# Patient Record
Sex: MALE | Race: WHITE | URBAN - METROPOLITAN AREA
[De-identification: names, ages, dates, MRNs, and addresses within clinical notes are randomized per-mention and may not be internally consistent; named-entity substitution may affect disease eponyms.]

---

## 2021-08-05 ENCOUNTER — EMERGENCY (EMERGENCY)
Facility: HOSPITAL | Age: 41
LOS: 1 days | End: 2021-08-05
Admitting: EMERGENCY MEDICINE
Payer: MEDICAID

## 2021-08-05 PROCEDURE — 99285 EMERGENCY DEPT VISIT HI MDM: CPT

## 2021-08-05 PROCEDURE — 99053 MED SERV 10PM-8AM 24 HR FAC: CPT

## 2021-08-06 PROCEDURE — 70450 CT HEAD/BRAIN W/O DYE: CPT | Mod: 26

## 2021-08-06 PROCEDURE — 71045 X-RAY EXAM CHEST 1 VIEW: CPT | Mod: 26

## 2021-08-06 PROCEDURE — 93010 ELECTROCARDIOGRAM REPORT: CPT

## 2021-08-07 PROCEDURE — 99213 OFFICE O/P EST LOW 20 MIN: CPT | Mod: 95

## 2021-08-07 RX ORDER — RISPERIDONE 4 MG/1
1 TABLET ORAL
Refills: 0 | Status: DISCONTINUED | OUTPATIENT
Start: 2021-08-08 | End: 2021-08-12

## 2021-08-07 RX ORDER — ACETAMINOPHEN 500 MG
650 TABLET ORAL EVERY 6 HOURS
Refills: 0 | Status: DISCONTINUED | OUTPATIENT
Start: 2021-08-08 | End: 2021-08-12

## 2021-08-07 RX ORDER — HALOPERIDOL DECANOATE 100 MG/ML
5 INJECTION INTRAMUSCULAR EVERY 4 HOURS
Refills: 0 | Status: DISCONTINUED | OUTPATIENT
Start: 2021-08-08 | End: 2021-08-12

## 2021-08-07 RX ORDER — TRAZODONE HCL 50 MG
50 TABLET ORAL AT BEDTIME
Refills: 0 | Status: DISCONTINUED | OUTPATIENT
Start: 2021-08-08 | End: 2021-08-12

## 2021-08-08 ENCOUNTER — INPATIENT (INPATIENT)
Facility: HOSPITAL | Age: 41
LOS: 3 days | Discharge: HOME | DRG: 897 | End: 2021-08-12
Attending: PSYCHIATRY & NEUROLOGY | Admitting: PSYCHIATRY & NEUROLOGY
Payer: MEDICAID

## 2021-08-08 VITALS
OXYGEN SATURATION: 99 % | SYSTOLIC BLOOD PRESSURE: 118 MMHG | HEART RATE: 66 BPM | RESPIRATION RATE: 17 BRPM | TEMPERATURE: 98 F | DIASTOLIC BLOOD PRESSURE: 76 MMHG

## 2021-08-08 PROCEDURE — 90792 PSYCH DIAG EVAL W/MED SRVCS: CPT | Mod: 95

## 2021-08-08 NOTE — CHART NOTE - NSCHARTNOTEFT_GEN_A_CORE
Reviewed [facility] records. Pt has been using crystal meth and was utterly disorganized on presentation there with thought blocking and inability to provide a narrative. Today pt is able to speak at length about his experiences but is very paranoid. He believes all of [employer] [place] is in cohoots against him spencer. his friend and landlord who is actually running a brothel out of the ground floor of the building. Pt believes "THEY" are poisoning him by putting some chemical in the fan of his car. Pt also put in a 72 hour letter. He has not been eating on the unit. -SI/HI/AH/VH. +PI.

## 2021-08-08 NOTE — ED BEHAVIORAL HEALTH NOTE - BEHAVIORAL HEALTH NOTE
“Patient interviewed and part B completed at [facility]. Orders have been entered and handoff provided to    by previous psychiatrist on day shift.  Full ED BH Assessment is located in MRN [id number] from [facility] ([facility]).

## 2021-08-09 DIAGNOSIS — F29 UNSPECIFIED PSYCHOSIS NOT DUE TO A SUBSTANCE OR KNOWN PHYSIOLOGICAL CONDITION: ICD-10-CM

## 2021-08-09 DIAGNOSIS — F19.922 OTHER PSYCHOACTIVE SUBSTANCE USE, UNSPECIFIED WITH INTOXICATION WITH PERCEPTUAL DISTURBANCE: ICD-10-CM

## 2021-08-09 DIAGNOSIS — F22 DELUSIONAL DISORDERS: ICD-10-CM

## 2021-08-09 PROCEDURE — 99232 SBSQ HOSP IP/OBS MODERATE 35: CPT | Mod: GC

## 2021-08-09 NOTE — BEHAVIORAL HEALTH ASSESSMENT NOTE - NSBHADMITIPSTRENGTH_PSY_A_CORE
In good physical health/Attempting to realize his/her potential/Has supportive interpersonal relationships with family, friends or peers/Has vocational interests or hobbies/Able to exercise self-direction/Involved in cultural/spiritual/Tenriism/community activities/Steady employment/Financial stability/Good impulse control/Intelligent/Creative

## 2021-08-09 NOTE — BEHAVIORAL HEALTH ASSESSMENT NOTE - NSBHCHARTREVIEWVS_PSY_A_CORE FT
Vital Signs Last 24 Hrs  T(C): 36.8 (09 Aug 2021 09:36), Max: 36.9 (08 Aug 2021 17:00)  T(F): 98.3 (09 Aug 2021 09:36), Max: 98.4 (08 Aug 2021 17:00)  HR: 92 (09 Aug 2021 09:36) (60 - 92)  BP: 108/68 (09 Aug 2021 09:36) (95/61 - 128/71)  BP(mean): --  RR: 20 (09 Aug 2021 09:36) (16 - 20)  SpO2: 98% (09 Aug 2021 09:36) (98% - 99%)

## 2021-08-09 NOTE — BEHAVIORAL HEALTH ASSESSMENT NOTE - SUMMARY
Patient is a 40 yo M with no past medical or psychiatric history, transferred to  from [facility], [place], where he was brought in for paranoid delusions involving people in his apartment building poisoning him and hacking his information. This is in the setting of recent crystal meth use. Patient demonstrates poor insight, unable to say why he is in the hospital since he feels these delusions are based in truth. We will start him on Risperidone 1 mg BID to treat acute psychosis.     Patient is vulnerable due to his lack of connection with outpatient treatment as this is his first episode, crystal meth use that may have precipitated his current psychosis, and currently unstable housing situation as his apartment is the setting of his delusions. He is protected by social support from his parents, employment, reported financial stability, and intelligence. Patient is a 42 yo M with history of bipolar disorder, polysubstance use disorder, no known past hospitalizations, transferred to  from [facility], where he was brought in confused, thought blocked, intoxicated, attempting to break into someone's house in [place]. This is in the setting of recent crystal meth use, reported life stressor, and untreated bipolar disorder. Patient demonstrates poor insight, unable to say why he is in the hospital since he feels these delusions are based in truth. We will start him on Risperidone 1 mg BID to treat acute psychosis.     Patient is vulnerable due to his lack of connection with outpatient treatment for bipolar disorder, amphetamine use (crystal meth?) that may have precipitated his current psychosis, and currently unstable housing situation as his apartment is the setting of his delusions. He is protected by social support from his parents, employment, reported financial stability, and intelligence.

## 2021-08-09 NOTE — BEHAVIORAL HEALTH ASSESSMENT NOTE - CASE SUMMARY
--interval cc and focus of care: paranoid thinking and anxiety ; outcome to date: patient refusing Risperdal for paranoia ; submitted 3DL (patient is 2PC);    --background: 40 yo M with history of bipolar disorder, amphetamine use, marijuana use, no other known psychiatric history, no known medical issues, employed, domiciled in private apartment in [place], transferred from [facility]. At [place], he was BIBEMS after "being found wandering barefoot in front of other people's houses and attempting to get in" while in [place]. Patient was acutely confused and thought blocked, unable to provide immediate answers about where he lives, what he does for work, or even . He admitted to use of alcohol, adderall, and cocaine earlier in the night. UTox at [facility] was positive for amphetamines and cannabis. Collateral obtained from mother, glen said he had untreated bipolar disorder exacerbated by recent stressors and advocated for his admission.   --course:  ;;: anxious ; refusing medications; paranoid thinking; not aggressive;  seeking release.  good adls; monitor behavior and judgment.   monitor for spontaneous improvement given amphetamine use and paranoid thinking  Alert; oriented x3; fund of knowledge intact; reg/recalls 3/3;  speech clear; good sometimes intense eye contact; talks about disatisfaction with his place of residence but makes comments about gases ; problems with credit cards ; not clear why sought psychisatric care "Feels safe" but characterizing his admission as voluntary (actually 2PC) and wishing to leave and disagreeing with use of Risperdal.  States he uses coca tea (which can give  positives for cocaine) and CBD guarded; anxious; slightly irritable; denies s/h i/i/p or avh;   paranoid thinking .  ij:  poor.    No acute medical issues;

## 2021-08-09 NOTE — BEHAVIORAL HEALTH ASSESSMENT NOTE - HPI (INCLUDE ILLNESS QUALITY, SEVERITY, DURATION, TIMING, CONTEXT, MODIFYING FACTORS, ASSOCIATED SIGNS AND SYMPTOMS)
Patient is a 42 yo M with history of crystal meth use, marijuana use, no other known psychiatric history, no known medical issues, employed, domiciled in private apartment in [place], transferred from [facility] where he was admitted for paranoid ideation/delusions about being poisoned at his apartment building.     On intake, patient shares that he is here because his friend tricked him into moving into an apartment he owns in [place], [place]. Where he now lives, there is a "brothel," on the first floor. Patient believes that people from the brothel are poisoning the vents in his apartment and his car with drugs that are causing him as well as his dogs to feel strange. He also says they have hacked into his phone, and that they are the reason that "all his credit cards" are no longer working. When asked why patient ended up at the hospital instead of going to a hotel or to stay with family, he is unable to offer a logical answer. He perseverates on these people and on the poison. He denies AVH/SI/HI. He denies sleep or appetite changes. He says he does not want to eat the food here because he prefers to only eat salad, but says he will if it means he will be discharged sooner.     Patient has no past psychiatric history, and does not report any medical issues. He takes no medications. He does not have a psychiatrist or therapist. Per chart review from [facility] patient uses crystal meth. UDS there was positive for cocaine. He says the reason for this is because he drinks "Coca" tea, which is made from the coca leaf, which is also used to make cocaine. Patient also reports marijuana/CBD use. He works as a  for [employer]. He completed high school and two years of college. He grew up in [place], where his parents still live. He has no siblings. He has no partner or children, has never been , and says he is not interested in dating. He says he enjoys yoga and reading about plants. Patient is a 40 yo M with history of bipolar disorder, amphetamine use, marijuana use, no other known psychiatric history, no known medical issues, employed, domiciled in private apartment in [place], transferred from [facility]. At [place], he was BIBEMS after "being found wandering barefoot in front of other people's houses and attempting to get in" while in [place]. Patient was acutely confused and thought blocked, unable to provide immediate answers about where he lives, what he does for work, or even . He admitted to use of alcohol, adderall, and cocaine earlier in the night. UTox at [facility] was positive for amphetamines and cannabis. Collateral obtained said he had untreated bipolar disorder exacerbated by recent stressors.     On intake, patient shares that he is here because his friend tricked him into moving into an apartment he owns in [place], [place]. Where he now lives, there is a "brothel," on the first floor. Patient believes that people from the brothel are poisoning the vents in his apartment and his car with drugs that are causing him as well as his dogs to feel strange. He also says they have hacked into his phone, and that they are the reason that "all his credit cards" are no longer working. When asked why patient ended up at the hospital instead of going to a hotel or to stay with family, he is unable to offer a logical answer. He perseverates on these people and on the poison. He denies AVH/SI/HI. He denies sleep or appetite changes. He says he does not want to eat the food here because he prefers to only eat salad, but says he will if it means he will be discharged sooner. He denies any history of manic or depressive episodes.     Patient has no past psychiatric history, and does not report any medical issues. He takes no medications. He does not have a psychiatrist or therapist. Per chart review from [facility] patient uses crystal meth. UDS there was positive for amphetamines and cannabis. He says the reason for this is because he drinks "Coca" tea, which is made from the coca leaf, which is also used to make cocaine (though UDS was negative for cocaine). Patient also reports marijuana/CBD use. He works as a  for [employer]. He completed high school and two years of college. He grew up in [place], where his parents still live. He has no siblings. He has no partner or children, has never been , and says he is not interested in dating. He says he enjoys yoga and reading about plants. Patient is a 40 yo M with history of bipolar disorder, amphetamine use, marijuana use, no other known psychiatric history, no known medical issues, employed, domiciled in private apartment in [place], transferred from [facility]. At [place], he was BIBEMS after "being found wandering barefoot in front of other people's houses and attempting to get in" while in [place]. Patient was acutely confused and thought blocked, unable to provide immediate answers about where he lives, what he does for work, or even . He admitted to use of alcohol, adderall, and cocaine earlier in the night. UTox at [facility] was positive for amphetamines and cannabis. Collateral obtained from mother, whop said he had untreated bipolar disorder exacerbated by recent stressors and advocated for his admission.     On intake, patient shares that he is here because his friend tricked him into moving into an apartment he owns in [place], [place]. Where he now lives, there is a "brothel," on the first floor. Patient believes that people from the brothel are poisoning the vents in his apartment and his car with drugs that are causing him as well as his dogs to feel strange. He also says they have hacked into his phone, and that they are the reason that "all his credit cards" are no longer working. When asked why patient ended up at the hospital instead of going to a hotel or to stay with family, he is unable to offer a logical answer. He perseverates on these people and on the poison. He denies AVH/SI/HI. He denies sleep or appetite changes. He says he does not want to eat the food here because he prefers to only eat salad, but says he will if it means he will be discharged sooner. He denies any history of manic or depressive episodes.     Patient has no past psychiatric history, and does not report any medical issues. He takes no medications. He does not have a psychiatrist or therapist. Per chart review from [facility] patient uses crystal meth. UDS there was positive for amphetamines and cannabis. He says the reason for this is because he drinks "Coca" tea, which is made from the coca leaf, which is also used to make cocaine (though UDS was negative for cocaine). Patient also reports marijuana/CBD use. He works as a  for [employer]. He completed high school and two years of college. He grew up in [place], where his parents still live. He has no siblings. He has no partner or children, has never been , and says he is not interested in dating. He says he enjoys yoga and reading about plants.

## 2021-08-09 NOTE — BEHAVIORAL HEALTH ASSESSMENT NOTE - NSBHREFERDETAILS_PSY_A_CORE_FT
Pt has been using crystal meth and was utterly disorganized on presentation there with thought blocking and inability to provide a narrative. Today pt is able to speak at length about his experiences but is very paranoid. He believes all of [employer] [place] is in cohoots against him spencer. his friend and landlord who is actually running a brothel out of the ground floor of the building. Pt believes "THEY" are poisoning him by putting some chemical in the fan of his car. Pt also put in a 72 hour letter. He has not been eating on the unit. -SI/HI/AH/VH. +PI.

## 2021-08-09 NOTE — BEHAVIORAL HEALTH ASSESSMENT NOTE - DIFFERENTIAL
personality disorder unspecified, psychosis unspecified, substance-induced psychotic disorder, crystal meth use disorder bipolar disorder, psychosis unspecified, substance-induced psychotic disorder, crystal meth use disorder

## 2021-08-10 LAB
A1C WITH ESTIMATED AVERAGE GLUCOSE RESULT: 5.1 % — SIGNIFICANT CHANGE UP (ref 4–5.6)
CHOLEST SERPL-MCNC: 137 MG/DL — SIGNIFICANT CHANGE UP
ESTIMATED AVERAGE GLUCOSE: 100 MG/DL — SIGNIFICANT CHANGE UP (ref 68–114)
HDLC SERPL-MCNC: 38 MG/DL — LOW
LIPID PNL WITH DIRECT LDL SERPL: 79 MG/DL — SIGNIFICANT CHANGE UP
NON HDL CHOLESTEROL: 99 MG/DL — SIGNIFICANT CHANGE UP
TRIGL SERPL-MCNC: 100 MG/DL — SIGNIFICANT CHANGE UP

## 2021-08-10 PROCEDURE — 99233 SBSQ HOSP IP/OBS HIGH 50: CPT

## 2021-08-10 NOTE — PROGRESS NOTE BEHAVIORAL HEALTH - NSBHADMITCOUNSELOTHER_PSY_A_CORE FT
Discussed the nefarious happenings in [place], [place], how he remembers how he got the scabs on his hands (before he had said they were part of the [place] plot but now he has a logical, reality-based explanation), and he reported how many healthy habits he has including yoga.

## 2021-08-11 PROCEDURE — 99232 SBSQ HOSP IP/OBS MODERATE 35: CPT | Mod: GC

## 2021-08-11 NOTE — PROGRESS NOTE BEHAVIORAL HEALTH - CASE SUMMARY
Substance induced-psychosis and pt has improved. He is less delusional. However he remains delusional enough that he might benefit from further treatment and it seems at this time tomld go to court for retention.
--interval cc and focus of care: paranoid thinking and anxiety ; outcome to date: patient refusing Risperdal for paranoia ; submitted 3DL (patient is 2PC);  improving paranoid thinking less prominent; does not meet criteria for involuntary treatment; dc in am ().  --background: 42 yo M with history of bipolar disorder, amphetamine use, marijuana use, no other known psychiatric history, no known medical issues, employed, domiciled in private apartment in [place], transferred from [facility]. At [place], he was BIBEMS after "being found wandering barefoot in front of other people's houses and attempting to get in" while in [place]. Patient was acutely confused and thought blocked, unable to provide immediate answers about where he lives, what he does for work, or even . He admitted to use of alcohol, adderall, and cocaine earlier in the night. UTox at [facility] was positive for amphetamines and cannabis. Collateral obtained from mother, whop said he had untreated bipolar disorder exacerbated by recent stressors and advocated for his admission.   --course:  ;;: anxious ; refusing medicaitons; parnaoid thinking; not aggressive;  seeking release.  good adls; monitor behavior and judgment.   monitor for spontaneous improvement given amphetamine use and paranoid thinking/    ;;: paranoid thinking less prominent and a little less anxious and a little less constricted; states he will be abstinent from drugs;  refusing Risperdal because "He doesn't need it";  dc at Court Order  "I am only going to eat salads";  patient requesting discharge; states he will abstain from all drugs; states sardines are a source of his protein.  Polite; anxious; denies avh or s/h i/i/p .  No reports of aggressive behavior or verbal threats;  dc in am as per order of the Court.

## 2021-08-11 NOTE — PROGRESS NOTE BEHAVIORAL HEALTH - MODIFICATIONS
Plan reviewed
improving with abstinence but refusing medication; d/c by Court Order; not meeting criteria for involuntary admiission

## 2021-08-12 VITALS
SYSTOLIC BLOOD PRESSURE: 110 MMHG | TEMPERATURE: 98 F | DIASTOLIC BLOOD PRESSURE: 64 MMHG | OXYGEN SATURATION: 99 % | RESPIRATION RATE: 20 BRPM | HEART RATE: 77 BPM

## 2021-08-12 PROCEDURE — 99232 SBSQ HOSP IP/OBS MODERATE 35: CPT

## 2021-08-12 NOTE — PROGRESS NOTE BEHAVIORAL HEALTH - NSBHCHARTREVIEWVS_PSY_A_CORE FT
Vital Signs Last 24 Hrs  T(C): 36.8 (10 Aug 2021 09:00), Max: 37.1 (09 Aug 2021 17:02)  T(F): 98.3 (10 Aug 2021 09:00), Max: 98.8 (09 Aug 2021 17:02)  HR: 99 (10 Aug 2021 09:00) (71 - 99)  BP: 118/73 (10 Aug 2021 09:00) (112/73 - 123/71)  BP(mean): --  RR: 18 (10 Aug 2021 09:00) (18 - 20)  SpO2: 99% (10 Aug 2021 09:00) (99% - 100%)
Vital Signs Last 24 Hrs  T(C): 36.9 (11 Aug 2021 16:48), Max: 36.9 (11 Aug 2021 16:48)  T(F): 98.4 (11 Aug 2021 16:48), Max: 98.4 (11 Aug 2021 16:48)  HR: 63 (11 Aug 2021 16:48) (63 - 71)  BP: 120/70 (11 Aug 2021 16:48) (113/76 - 120/70)  BP(mean): --  RR: 18 (11 Aug 2021 16:48) (18 - 18)  SpO2: 99% (11 Aug 2021 16:48) (99% - 100%)
Vital Signs Last 24 Hrs  T(C): 36.9 (10 Aug 2021 20:39), Max: 37.1 (10 Aug 2021 15:00)  T(F): 98.4 (10 Aug 2021 20:39), Max: 98.7 (10 Aug 2021 15:00)  HR: 79 (10 Aug 2021 20:39) (79 - 99)  BP: 119/78 (10 Aug 2021 20:39) (112/71 - 119/78)  BP(mean): --  RR: 18 (10 Aug 2021 20:39) (18 - 20)  SpO2: 99% (10 Aug 2021 20:39) (95% - 99%)

## 2021-08-12 NOTE — PROGRESS NOTE BEHAVIORAL HEALTH - PRIMARY DX
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
Psychosis, unspecified psychosis type
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

## 2021-08-12 NOTE — PROGRESS NOTE BEHAVIORAL HEALTH - PROBLEM SELECTOR PROBLEM 2
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

## 2021-08-12 NOTE — PROGRESS NOTE BEHAVIORAL HEALTH - NSBHFUPINTERVALHXFT_PSY_A_CORE
Patient seen today in [facility], where he had created a yoga mat out of towels and was kneeling. He refused his Risperidone yesterday. He says he is not sick and only puts natural things into his body. He says he did not take crystal meth (as was mentioned in the chart), but does use Adderall. He reiterates that it is true that the people in the brothel (disguised as a restaurant) in the first floor of the building were poisoning him. He continues to perseverate on this. When confronted about how he had broken into a house in [place], he says it was because he was poisoned. He denies AVH/SI/HI.
Patient seen today in [facility]. He was excited, as he was discharged by the court and is going home tomorrow. He appears more linear, less oddly related, and less delusional today. He is goal-oriented, saying that he plans tomorrow after discharge to get his tablet and go to the bank. He was in the process of making a list on his phone of all the things he needs to do on discharge, which seemed practical and reasonable. It seems more likely that patient's presentation of delusions may have been substance-induced (amphetamine) rather than from a primary psychotic disorder. He maintains that he believes the people from the brothel in [place] were poisoning him, but says "I'm outta there," indicating that he intends to move without delay.
Talks about future plans to move to [place]; how he has two dogs ; how he used to trade stocks using oversold strategy;  no s/h i/i/p or avh; anxious but not expressing spontaneously any paranoid thoughts about where he lived or people harming him; speech clear;  States that he used Coca Tea to help with knee pain; encouraged to seek medical evaluation of cause of pain.

## 2021-08-17 DIAGNOSIS — F19.922 OTHER PSYCHOACTIVE SUBSTANCE USE, UNSPECIFIED WITH INTOXICATION WITH PERCEPTUAL DISTURBANCE: ICD-10-CM

## 2021-08-17 DIAGNOSIS — F31.9 BIPOLAR DISORDER, UNSPECIFIED: ICD-10-CM

## 2021-08-17 DIAGNOSIS — F15.950 OTHER STIMULANT USE, UNSPECIFIED WITH STIMULANT-INDUCED PSYCHOTIC DISORDER WITH DELUSIONS: ICD-10-CM

## 2021-08-17 DIAGNOSIS — F22 DELUSIONAL DISORDERS: ICD-10-CM

## 2021-08-17 DIAGNOSIS — F12.950 CANNABIS USE, UNSPECIFIED WITH PSYCHOTIC DISORDER WITH DELUSIONS: ICD-10-CM

## 2021-08-17 DIAGNOSIS — F29 UNSPECIFIED PSYCHOSIS NOT DUE TO A SUBSTANCE OR KNOWN PHYSIOLOGICAL CONDITION: ICD-10-CM

## 2021-08-22 PROCEDURE — 80061 LIPID PANEL: CPT

## 2021-08-22 PROCEDURE — 83036 HEMOGLOBIN GLYCOSYLATED A1C: CPT

## 2021-08-22 PROCEDURE — 36415 COLL VENOUS BLD VENIPUNCTURE: CPT

## 2023-11-15 NOTE — PROGRESS NOTE BEHAVIORAL HEALTH - SUMMARY
Patient is a 40 yo M with history of bipolar disorder, polysubstance use disorder, no known past hospitalizations, transferred to  from [facility], where he was brought in confused, thought blocked, intoxicated, attempting to break into someone's house in [place]. This is in the setting of recent crystal meth use, reported life stressor, and untreated bipolar disorder. Patient demonstrates poor insight, unable to say why he is in the hospital since he feels these delusions are based in truth. We will start him on Risperidone 1 mg BID to treat acute psychosis. He is refusing medication at this time.     Patient is vulnerable due to his lack of connection with outpatient treatment for bipolar disorder, amphetamine use that may have precipitated his current psychosis, and currently unstable housing situation as his apartment is the setting of his delusions. He is also at risk because he is not complying with treatment with Risperidone at this time. He is protected by social support from his parents, employment, reported financial stability, and intelligence.    8/10: patient refusing Risperidone. saying he does not need it, continues to perseverate on delusion of being poisoned; doing yoga on towel in TV room
--interval cc and focus of care: paranoid thinking and anxiety ; outcome to date: patient refusing Risperdal for paranoia ; submitted 3DL (patient is 2PC);  improving paranoid thinking less prominent; does not meet criteria for involuntary treatment; dc in am ().  --background: 40 yo M with history of bipolar disorder, amphetamine use, marijuana use, no other known psychiatric history, no known medical issues, employed, domiciled in private apartment in [place], transferred from [facility]. At [place], he was BIBEMS after "being found wandering barefoot in front of other people's houses and attempting to get in" while in [place]. Patient was acutely confused and thought blocked, unable to provide immediate answers about where he lives, what he does for work, or even . He admitted to use of alcohol, adderall, and cocaine earlier in the night. UTox at [facility] was positive for amphetamines and cannabis. Collateral obtained from mother, whop said he had untreated bipolar disorder exacerbated by recent stressors and advocated for his admission.   --course:  ;;: anxious ; refusing medicaitons; parnaoid thinking; not aggressive;  seeking release.  good adls; monitor behavior and judgment.   monitor for spontaneous improvement given amphetamine use and paranoid thinking/    ;;: paranoid thinking less prominent and a little less anxious and a little less constricted; states he will be abstinent from drugs;  refusing Risperdal because "He doesn't need it";  dc at request to Court in am.   ;;: future oriented; discussed with patient issues of substance abuse and patient states he will get rid of all his Coca[employer]tea; aware of potential for drugs to "unmask" mood issues; unclear about consent to speak to family;  no s/h i/i/p "I have never been violent" ;  no behaviioral issues on unit.  dc/ to day as per Court Order.  Patient refuses all medications. COVID test pending.
Simple: Patient demonstrates quick and easy understanding/Verbalized Understanding
Patient is a 42 yo M with history of bipolar disorder, polysubstance use disorder, no known past hospitalizations, transferred to  from [facility], where he was brought in confused, thought blocked, intoxicated, attempting to break into someone's house in [place]. This is in the setting of recent amphetamine use, reported life stressor, and untreated bipolar disorder. Patient demonstrates poor insight, unable to say why he is in the hospital since he feels these delusions are based in truth. We will start him on Risperidone 1 mg BID to treat acute psychosis. He is refusing medication at this time.     Patient is vulnerable due to his lack of connection with outpatient treatment for bipolar disorder, amphetamine use that may have precipitated his current psychosis, and currently unstable housing situation as his apartment is the setting of his delusions. He is also at risk because he is not complying with treatment with Risperidone at this time. He is protected by social support from his parents, employment, reported financial stability, and intelligence.    8/10: patient refusing Risperidone. saying he does not need it, continues to perseverate on delusion of being poisoned; doing yoga on towel in TV room  8/11: patient released by court. Did not take Risperidone during stay. Seems more linear, fairly related, less delusional. Psychosis was likely substance induced rather than primary psychotic process.